# Patient Record
Sex: MALE | Race: OTHER | Employment: FULL TIME | URBAN - METROPOLITAN AREA
[De-identification: names, ages, dates, MRNs, and addresses within clinical notes are randomized per-mention and may not be internally consistent; named-entity substitution may affect disease eponyms.]

---

## 2017-12-30 ENCOUNTER — HOSPITAL ENCOUNTER (EMERGENCY)
Age: 27
Discharge: HOME | End: 2017-12-30
Attending: FAMILY MEDICINE

## 2017-12-30 VITALS
TEMPERATURE: 98.5 F | OXYGEN SATURATION: 99 % | BODY MASS INDEX: 23.68 KG/M2 | DIASTOLIC BLOOD PRESSURE: 77 MMHG | SYSTOLIC BLOOD PRESSURE: 109 MMHG | WEIGHT: 165 LBS | HEART RATE: 107 BPM | RESPIRATION RATE: 14 BRPM

## 2017-12-30 DIAGNOSIS — K52.9 GASTROENTERITIS: Primary | ICD-10-CM

## 2017-12-30 LAB
ALBUMIN SERPL-MCNC: 5.1 G/DL (ref 3.9–4.9)
ALP BLD-CCNC: 110 U/L (ref 35–104)
ALT SERPL-CCNC: 94 U/L (ref 0–41)
ANION GAP SERPL CALCULATED.3IONS-SCNC: 17 MEQ/L (ref 7–13)
AST SERPL-CCNC: 34 U/L (ref 0–40)
BASOPHILS ABSOLUTE: 0 K/UL (ref 0–0.2)
BASOPHILS RELATIVE PERCENT: 0.1 %
BILIRUB SERPL-MCNC: 0.7 MG/DL (ref 0–1.2)
BUN BLDV-MCNC: 17 MG/DL (ref 6–20)
CALCIUM SERPL-MCNC: 10 MG/DL (ref 8.6–10.2)
CHLORIDE BLD-SCNC: 100 MEQ/L (ref 98–107)
CO2: 24 MEQ/L (ref 22–29)
CREAT SERPL-MCNC: 0.78 MG/DL (ref 0.7–1.2)
EOSINOPHILS ABSOLUTE: 0.1 K/UL (ref 0–0.7)
EOSINOPHILS RELATIVE PERCENT: 0.6 %
GFR AFRICAN AMERICAN: >60
GFR NON-AFRICAN AMERICAN: >60
GLOBULIN: 3 G/DL (ref 2.3–3.5)
GLUCOSE BLD-MCNC: 125 MG/DL (ref 74–109)
HCT VFR BLD CALC: 52.1 % (ref 42–52)
HEMOGLOBIN: 17.2 G/DL (ref 14–18)
LYMPHOCYTES ABSOLUTE: 0.3 K/UL (ref 1–4.8)
LYMPHOCYTES RELATIVE PERCENT: 1.5 %
MCH RBC QN AUTO: 27.9 PG (ref 27–31.3)
MCHC RBC AUTO-ENTMCNC: 33 % (ref 33–37)
MCV RBC AUTO: 84.7 FL (ref 80–100)
MONOCYTES ABSOLUTE: 0.5 K/UL (ref 0.2–0.8)
MONOCYTES RELATIVE PERCENT: 2.7 %
NEUTROPHILS ABSOLUTE: 16.9 K/UL (ref 1.4–6.5)
NEUTROPHILS RELATIVE PERCENT: 95.1 %
PDW BLD-RTO: 13.3 % (ref 11.5–14.5)
PLATELET # BLD: 267 K/UL (ref 130–400)
POTASSIUM SERPL-SCNC: 4.6 MEQ/L (ref 3.5–5.1)
RBC # BLD: 6.15 M/UL (ref 4.7–6.1)
SODIUM BLD-SCNC: 141 MEQ/L (ref 132–144)
TOTAL PROTEIN: 8.1 G/DL (ref 6.4–8.1)
WBC # BLD: 17.8 K/UL (ref 4.8–10.8)

## 2017-12-30 PROCEDURE — 80053 COMPREHEN METABOLIC PANEL: CPT

## 2017-12-30 PROCEDURE — 96361 HYDRATE IV INFUSION ADD-ON: CPT

## 2017-12-30 PROCEDURE — 6360000002 HC RX W HCPCS: Performed by: FAMILY MEDICINE

## 2017-12-30 PROCEDURE — 96374 THER/PROPH/DIAG INJ IV PUSH: CPT

## 2017-12-30 PROCEDURE — 99284 EMERGENCY DEPT VISIT MOD MDM: CPT

## 2017-12-30 PROCEDURE — 2580000003 HC RX 258: Performed by: FAMILY MEDICINE

## 2017-12-30 PROCEDURE — 96372 THER/PROPH/DIAG INJ SC/IM: CPT

## 2017-12-30 PROCEDURE — 85025 COMPLETE CBC W/AUTO DIFF WBC: CPT

## 2017-12-30 PROCEDURE — 36415 COLL VENOUS BLD VENIPUNCTURE: CPT

## 2017-12-30 RX ORDER — ONDANSETRON 2 MG/ML
4 INJECTION INTRAMUSCULAR; INTRAVENOUS ONCE
Status: COMPLETED | OUTPATIENT
Start: 2017-12-30 | End: 2017-12-30

## 2017-12-30 RX ORDER — DICYCLOMINE HYDROCHLORIDE 10 MG/ML
20 INJECTION INTRAMUSCULAR ONCE
Status: COMPLETED | OUTPATIENT
Start: 2017-12-30 | End: 2017-12-30

## 2017-12-30 RX ORDER — 0.9 % SODIUM CHLORIDE 0.9 %
1000 INTRAVENOUS SOLUTION INTRAVENOUS ONCE
Status: COMPLETED | OUTPATIENT
Start: 2017-12-30 | End: 2017-12-30

## 2017-12-30 RX ORDER — DICYCLOMINE HYDROCHLORIDE 10 MG/1
10 CAPSULE ORAL EVERY 6 HOURS PRN
Qty: 20 CAPSULE | Refills: 0 | Status: SHIPPED | OUTPATIENT
Start: 2017-12-30 | End: 2018-06-18

## 2017-12-30 RX ORDER — ONDANSETRON 4 MG/1
4 TABLET, ORALLY DISINTEGRATING ORAL EVERY 8 HOURS PRN
Qty: 20 TABLET | Refills: 0 | Status: SHIPPED | OUTPATIENT
Start: 2017-12-30 | End: 2018-06-18

## 2017-12-30 RX ADMIN — ONDANSETRON 4 MG: 2 INJECTION INTRAMUSCULAR; INTRAVENOUS at 13:32

## 2017-12-30 RX ADMIN — SODIUM CHLORIDE 1000 ML: 9 INJECTION, SOLUTION INTRAVENOUS at 13:32

## 2017-12-30 RX ADMIN — DICYCLOMINE HYDROCHLORIDE 20 MG: 20 INJECTION, SOLUTION INTRAMUSCULAR at 14:15

## 2017-12-30 ASSESSMENT — ENCOUNTER SYMPTOMS
VOMITING: 1
ABDOMINAL PAIN: 1
DIARRHEA: 1
RESPIRATORY NEGATIVE: 1
ALLERGIC/IMMUNOLOGIC NEGATIVE: 1
NAUSEA: 1
EYES NEGATIVE: 1

## 2017-12-30 ASSESSMENT — PAIN DESCRIPTION - LOCATION: LOCATION: ABDOMEN

## 2017-12-30 ASSESSMENT — PAIN DESCRIPTION - ORIENTATION: ORIENTATION: LEFT;MID;UPPER

## 2017-12-30 ASSESSMENT — PAIN SCALES - GENERAL: PAINLEVEL_OUTOF10: 9

## 2017-12-30 ASSESSMENT — PAIN DESCRIPTION - DESCRIPTORS: DESCRIPTORS: SHARP

## 2017-12-30 ASSESSMENT — PAIN DESCRIPTION - FREQUENCY: FREQUENCY: INTERMITTENT

## 2017-12-30 NOTE — ED PROVIDER NOTES
[address] [facility] ED  eMERGENCY dEPARTMENT eNCOUnter      Pt Name: Chun Jasso  MRN: [id number]  [place] 1990  Date of evaluation: 12/30/2017  Provider: Jeanne Amezcua MD    [address] [place] [address]       Chief Complaint   Patient presents with    Emesis     diarrhea/weakness since this am-thinks stomach flu or food poisoning-heart palpitations/sob         HISTORY OF PRESENT ILLNESS   (Location/Symptom, Timing/Onset, Context/Setting, Quality, Duration, Modifying Factors, Severity)  Note limiting factors. Chun Jasso is a 24 y.o. male who presents to the emergency department    Nausea vomiting diarrhea and abdominal  27 There is old male patient. State He ate Brit Ar[employer] and Valma Carota yesterday. This morning he woke up with nausea vomiting abdominal cramping since 3 AM this morning. Deny fevers chills deny any blood or mucus in stool deny any other complaint or concern      The history is provided by the patient. Nursing Notes were reviewed. REVIEW OF SYSTEMS    (2-9 systems for level 4, 10 or more for level 5)     Review of Systems   Constitutional: Negative. Eyes: Negative. Respiratory: Negative. Gastrointestinal: Positive for abdominal pain, diarrhea, nausea and vomiting. Endocrine: Negative. Genitourinary: Negative. Musculoskeletal: Negative. Allergic/Immunologic: Negative. Neurological: Negative. Hematological: Negative. Psychiatric/Behavioral: Negative. Except as noted above the remainder of the review of systems was reviewed and negative. PAST MEDICAL HISTORY     Past Medical History:   Diagnosis Date    Anxiety     Atypical chest pain     Depression     FH: CAD (coronary artery disease)     History of tobacco abuse          SURGICAL HISTORY     History reviewed. No pertinent surgical history.       CURRENT MEDICATIONS       Previous Medications    No medications on file       ALLERGIES     Iv contrast [iodides]    FAMILY HISTORY     History reflexes. Skin: Skin is warm and dry. Psychiatric: He has a normal mood and affect. His behavior is normal. Judgment and thought content normal.   Nursing note and vitals reviewed. DIAGNOSTIC RESULTS     EKG: All EKG's are interpreted by the Emergency Department Physician who either signs or Co-signs this chart in the absence of a cardiologist.      RADIOLOGY:   Non-plain film images such as CT, Ultrasound and MRI are read by the radiologist. Plain radiographic images are visualized and preliminarily interpreted by the emergency physician with the below findings:      Interpretation per the Radiologist below, if available at the time of this note:    No orders to display         ED BEDSIDE ULTRASOUND:   Performed by ED Physician - none    LABS:  Labs Reviewed   COMPREHENSIVE METABOLIC PANEL - Abnormal; Notable for the following:        Result Value    Anion Gap 17 (*)     Glucose 125 (*)     Alb 5.1 (*)     Alkaline Phosphatase 110 (*)     ALT 94 (*)     All other components within normal limits   CBC WITH AUTO DIFFERENTIAL - Abnormal; Notable for the following:     WBC 17.8 (*)     RBC 6.15 (*)     Hematocrit 52.1 (*)     Neutrophils # 16.9 (*)     Lymphocytes # 0.3 (*)     All other components within normal limits       All other labs were within normal range or not returned as of this dictation. EMERGENCY DEPARTMENT COURSE and DIFFERENTIAL DIAGNOSIS/MDM:   Vitals:    Vitals:    12/30/17 1304   BP: 109/77   Pulse: 107   Resp: 14   Temp: 98.5 °F (36.9 °C)   SpO2: 99%   Weight: 165 lb (74.8 kg)       ED Course      Stable   MDM  Number of Diagnoses or Management Options  Diagnosis management comments: Patient presents with viral gastroenteritis that started this morning.   Lab stable patient was given IV fluid and Zofran had no active vomiting in the ER with discharged home with Bentyl Zofran and to follow up with PCP       Amount and/or Complexity of Data Reviewed  Clinical lab tests: ordered and reviewed        None    PROCEDURES:  Unless otherwise noted below, none     Procedures    FINAL IMPRESSION      1. Gastroenteritis          DISPOSITION/PLAN   DISPOSITION Decision To Discharge 12/30/2017 04:09:24 PM      PATIENT REFERRED TO:  No follow-up provider specified.     DISCHARGE MEDICATIONS:  New Prescriptions    DICYCLOMINE (BENTYL) 10 MG CAPSULE    Take 1 capsule by mouth every 6 hours as needed (cramps)    ONDANSETRON (ZOFRAN ODT) 4 MG DISINTEGRATING TABLET    Take 1 tablet by mouth every 8 hours as needed for Nausea          (Please note that portions of this note were completed with a voice recognition program.  Efforts were made to edit the dictations but occasionally words are mis-transcribed.)    Albino Villalobos MD (electronically signed)  Attending Emergency Physician          Cem Bermeo MD  12/30/17 [address] [facility] Graham Stark MD  12/30/17 [phone]

## 2018-06-18 ENCOUNTER — HOSPITAL ENCOUNTER (EMERGENCY)
Age: 28
Discharge: HOME | End: 2018-06-18
Attending: EMERGENCY MEDICINE

## 2018-06-18 VITALS
TEMPERATURE: 98.6 F | BODY MASS INDEX: 22.96 KG/M2 | HEIGHT: 69 IN | WEIGHT: 155 LBS | OXYGEN SATURATION: 98 % | SYSTOLIC BLOOD PRESSURE: 116 MMHG | HEART RATE: 79 BPM | RESPIRATION RATE: 16 BRPM | DIASTOLIC BLOOD PRESSURE: 80 MMHG

## 2018-06-18 DIAGNOSIS — J01.10 ACUTE NON-RECURRENT FRONTAL SINUSITIS: Primary | ICD-10-CM

## 2018-06-18 PROCEDURE — 6360000002 HC RX W HCPCS: Performed by: EMERGENCY MEDICINE

## 2018-06-18 PROCEDURE — 99282 EMERGENCY DEPT VISIT SF MDM: CPT

## 2018-06-18 RX ORDER — DEXAMETHASONE SODIUM PHOSPHATE 10 MG/ML
10 INJECTION INTRAMUSCULAR; INTRAVENOUS ONCE
Status: COMPLETED | OUTPATIENT
Start: 2018-06-18 | End: 2018-06-18

## 2018-06-18 RX ORDER — AZITHROMYCIN 250 MG/1
TABLET, FILM COATED ORAL
Qty: 1 PACKET | Refills: 0 | Status: SHIPPED | OUTPATIENT
Start: 2018-06-18 | End: 2019-03-04

## 2018-06-18 RX ADMIN — DEXAMETHASONE SODIUM PHOSPHATE 10 MG: 10 INJECTION INTRAMUSCULAR; INTRAVENOUS at 14:55

## 2018-06-18 ASSESSMENT — PAIN DESCRIPTION - ONSET: ONSET: AWAKENED FROM SLEEP

## 2018-06-18 ASSESSMENT — PAIN SCALES - GENERAL: PAINLEVEL_OUTOF10: 5

## 2018-06-18 ASSESSMENT — ENCOUNTER SYMPTOMS
COUGH: 0
SORE THROAT: 1
FACIAL SWELLING: 1

## 2018-06-18 ASSESSMENT — PAIN DESCRIPTION - PAIN TYPE: TYPE: ACUTE PAIN

## 2018-06-18 ASSESSMENT — PAIN DESCRIPTION - PROGRESSION: CLINICAL_PROGRESSION: NOT CHANGED

## 2018-06-18 ASSESSMENT — PAIN DESCRIPTION - FREQUENCY: FREQUENCY: CONTINUOUS

## 2018-06-18 ASSESSMENT — PAIN DESCRIPTION - LOCATION: LOCATION: FACE

## 2018-06-26 ENCOUNTER — HOSPITAL ENCOUNTER (EMERGENCY)
Age: 28
Discharge: HOME | End: 2018-06-26

## 2018-06-26 VITALS
SYSTOLIC BLOOD PRESSURE: 119 MMHG | TEMPERATURE: 98.2 F | BODY MASS INDEX: 22.19 KG/M2 | OXYGEN SATURATION: 99 % | HEART RATE: 64 BPM | RESPIRATION RATE: 16 BRPM | DIASTOLIC BLOOD PRESSURE: 70 MMHG | WEIGHT: 155 LBS | HEIGHT: 70 IN

## 2018-06-26 DIAGNOSIS — R09.82 POST-NASAL DRIP: Primary | ICD-10-CM

## 2018-06-26 LAB
MONO TEST: NEGATIVE
S PYO AG THROAT QL: NEGATIVE

## 2018-06-26 PROCEDURE — 99282 EMERGENCY DEPT VISIT SF MDM: CPT

## 2018-06-26 PROCEDURE — 87880 STREP A ASSAY W/OPTIC: CPT

## 2018-06-26 PROCEDURE — 87081 CULTURE SCREEN ONLY: CPT

## 2018-06-26 PROCEDURE — 86308 HETEROPHILE ANTIBODY SCREEN: CPT

## 2018-06-26 RX ORDER — IBUPROFEN 800 MG/1
800 TABLET ORAL EVERY 8 HOURS PRN
Qty: 12 TABLET | Refills: 0 | Status: SHIPPED | OUTPATIENT
Start: 2018-06-26 | End: 2019-01-04

## 2018-06-26 RX ORDER — LORATADINE 10 MG/1
10 TABLET ORAL DAILY
Qty: 20 TABLET | Refills: 0 | Status: SHIPPED | OUTPATIENT
Start: 2018-06-26 | End: 2019-03-04

## 2018-06-26 ASSESSMENT — PAIN DESCRIPTION - LOCATION: LOCATION: THROAT

## 2018-06-26 ASSESSMENT — ENCOUNTER SYMPTOMS
GASTROINTESTINAL NEGATIVE: 1
EYES NEGATIVE: 1
SORE THROAT: 1
RESPIRATORY NEGATIVE: 1

## 2018-06-26 ASSESSMENT — PAIN SCALES - GENERAL: PAINLEVEL_OUTOF10: 6

## 2018-06-26 ASSESSMENT — PAIN DESCRIPTION - DESCRIPTORS: DESCRIPTORS: BURNING

## 2018-06-28 LAB — S PYO THROAT QL CULT: NORMAL

## 2019-01-04 ENCOUNTER — HOSPITAL ENCOUNTER (EMERGENCY)
Age: 29
Discharge: HOME | End: 2019-01-04

## 2019-01-04 VITALS
WEIGHT: 165 LBS | SYSTOLIC BLOOD PRESSURE: 136 MMHG | RESPIRATION RATE: 18 BRPM | OXYGEN SATURATION: 98 % | DIASTOLIC BLOOD PRESSURE: 78 MMHG | HEART RATE: 79 BPM | BODY MASS INDEX: 25.01 KG/M2 | TEMPERATURE: 98.6 F | HEIGHT: 68 IN

## 2019-01-04 DIAGNOSIS — J30.9 ALLERGIC RHINITIS, UNSPECIFIED SEASONALITY, UNSPECIFIED TRIGGER: ICD-10-CM

## 2019-01-04 DIAGNOSIS — J01.00 ACUTE MAXILLARY SINUSITIS, RECURRENCE NOT SPECIFIED: Primary | ICD-10-CM

## 2019-01-04 LAB
RAPID INFLUENZA  B AGN: NEGATIVE
RAPID INFLUENZA A AGN: NEGATIVE
S PYO AG THROAT QL: NEGATIVE

## 2019-01-04 PROCEDURE — 87081 CULTURE SCREEN ONLY: CPT

## 2019-01-04 PROCEDURE — 87804 INFLUENZA ASSAY W/OPTIC: CPT

## 2019-01-04 PROCEDURE — 99283 EMERGENCY DEPT VISIT LOW MDM: CPT

## 2019-01-04 PROCEDURE — 87880 STREP A ASSAY W/OPTIC: CPT

## 2019-01-04 RX ORDER — AZELASTINE 1 MG/ML
1 SPRAY, METERED NASAL 2 TIMES DAILY
Qty: 2 BOTTLE | Refills: 1 | Status: SHIPPED | OUTPATIENT
Start: 2019-01-04 | End: 2019-03-04

## 2019-01-04 RX ORDER — IBUPROFEN 400 MG/1
400 TABLET ORAL EVERY 6 HOURS PRN
Qty: 120 TABLET | Refills: 0 | Status: SHIPPED | OUTPATIENT
Start: 2019-01-04

## 2019-01-04 RX ORDER — AMOXICILLIN AND CLAVULANATE POTASSIUM 875; 125 MG/1; MG/1
1 TABLET, FILM COATED ORAL 2 TIMES DAILY
Qty: 20 TABLET | Refills: 0 | Status: SHIPPED | OUTPATIENT
Start: 2019-01-04 | End: 2019-01-14

## 2019-01-04 ASSESSMENT — ENCOUNTER SYMPTOMS
VOICE CHANGE: 0
SINUS PRESSURE: 1
VOMITING: 0
COLOR CHANGE: 0
APNEA: 0
COUGH: 0
ABDOMINAL PAIN: 0
ANAL BLEEDING: 0
FACIAL SWELLING: 0
PHOTOPHOBIA: 0
SHORTNESS OF BREATH: 0
EYE REDNESS: 0
EYE DISCHARGE: 0
SINUS PAIN: 1
ABDOMINAL DISTENTION: 0
NAUSEA: 0
BACK PAIN: 0

## 2019-01-04 ASSESSMENT — PAIN DESCRIPTION - FREQUENCY: FREQUENCY: CONTINUOUS

## 2019-01-04 ASSESSMENT — PAIN DESCRIPTION - DESCRIPTORS: DESCRIPTORS: ACHING;PRESSURE;DISCOMFORT

## 2019-01-04 ASSESSMENT — PAIN DESCRIPTION - PAIN TYPE: TYPE: ACUTE PAIN

## 2019-01-04 ASSESSMENT — PAIN DESCRIPTION - LOCATION: LOCATION: FACE

## 2019-01-04 ASSESSMENT — PAIN DESCRIPTION - ONSET: ONSET: ON-GOING

## 2019-01-04 ASSESSMENT — PAIN SCALES - GENERAL: PAINLEVEL_OUTOF10: 5

## 2019-01-04 ASSESSMENT — PAIN DESCRIPTION - PROGRESSION: CLINICAL_PROGRESSION: NOT CHANGED

## 2019-01-06 LAB — S PYO THROAT QL CULT: NORMAL

## 2019-03-04 ENCOUNTER — HOSPITAL ENCOUNTER (EMERGENCY)
Age: 29
Discharge: HOME | End: 2019-03-05

## 2019-03-04 VITALS
WEIGHT: 165 LBS | SYSTOLIC BLOOD PRESSURE: 126 MMHG | TEMPERATURE: 98.4 F | HEIGHT: 69 IN | OXYGEN SATURATION: 99 % | BODY MASS INDEX: 24.44 KG/M2 | DIASTOLIC BLOOD PRESSURE: 77 MMHG | RESPIRATION RATE: 20 BRPM | HEART RATE: 68 BPM

## 2019-03-04 DIAGNOSIS — J06.9 UPPER RESPIRATORY TRACT INFECTION, UNSPECIFIED TYPE: ICD-10-CM

## 2019-03-04 DIAGNOSIS — J01.90 ACUTE NON-RECURRENT SINUSITIS, UNSPECIFIED LOCATION: Primary | ICD-10-CM

## 2019-03-04 LAB
RAPID INFLUENZA  B AGN: NEGATIVE
RAPID INFLUENZA A AGN: NEGATIVE
S PYO AG THROAT QL: NEGATIVE

## 2019-03-04 PROCEDURE — 6360000002 HC RX W HCPCS: Performed by: PERSONAL EMERGENCY RESPONSE ATTENDANT

## 2019-03-04 PROCEDURE — 99283 EMERGENCY DEPT VISIT LOW MDM: CPT

## 2019-03-04 PROCEDURE — 87804 INFLUENZA ASSAY W/OPTIC: CPT

## 2019-03-04 PROCEDURE — 87081 CULTURE SCREEN ONLY: CPT

## 2019-03-04 PROCEDURE — 87880 STREP A ASSAY W/OPTIC: CPT

## 2019-03-04 RX ADMIN — DEXAMETHASONE INTENSOL 8 MG: 1 SOLUTION, CONCENTRATE ORAL at 23:15

## 2019-03-04 ASSESSMENT — ENCOUNTER SYMPTOMS
COUGH: 1
ABDOMINAL PAIN: 0
COLOR CHANGE: 0
NAUSEA: 0
RHINORRHEA: 0
SINUS PAIN: 1
SINUS PRESSURE: 1
DIARRHEA: 0
VOMITING: 0
SORE THROAT: 1
BLOOD IN STOOL: 0
SHORTNESS OF BREATH: 0

## 2019-03-04 ASSESSMENT — PAIN DESCRIPTION - LOCATION: LOCATION: THROAT

## 2019-03-04 ASSESSMENT — PAIN DESCRIPTION - FREQUENCY: FREQUENCY: CONTINUOUS

## 2019-03-04 ASSESSMENT — PAIN SCALES - GENERAL: PAINLEVEL_OUTOF10: 6

## 2019-03-04 ASSESSMENT — PAIN DESCRIPTION - DESCRIPTORS: DESCRIPTORS: THROBBING

## 2019-03-04 ASSESSMENT — PAIN DESCRIPTION - PAIN TYPE: TYPE: ACUTE PAIN

## 2019-03-05 RX ORDER — AMOXICILLIN 500 MG/1
500 CAPSULE ORAL 2 TIMES DAILY
Qty: 14 CAPSULE | Refills: 0 | Status: SHIPPED | OUTPATIENT
Start: 2019-03-05 | End: 2019-03-12

## 2019-03-05 RX ORDER — IPRATROPIUM BROMIDE 42 UG/1
2 SPRAY, METERED NASAL 4 TIMES DAILY
Qty: 1 BOTTLE | Refills: 0 | Status: SHIPPED | OUTPATIENT
Start: 2019-03-05 | End: 2019-04-04

## 2019-03-06 LAB — S PYO THROAT QL CULT: NORMAL

## 2020-03-02 ENCOUNTER — APPOINTMENT (OUTPATIENT)
Dept: GENERAL RADIOLOGY | Age: 30
End: 2020-03-02
Payer: COMMERCIAL

## 2020-03-02 ENCOUNTER — HOSPITAL ENCOUNTER (EMERGENCY)
Age: 30
Discharge: HOME | End: 2020-03-02
Payer: COMMERCIAL

## 2020-03-02 VITALS
SYSTOLIC BLOOD PRESSURE: 135 MMHG | RESPIRATION RATE: 19 BRPM | OXYGEN SATURATION: 95 % | HEIGHT: 69 IN | BODY MASS INDEX: 26.66 KG/M2 | HEART RATE: 68 BPM | DIASTOLIC BLOOD PRESSURE: 78 MMHG | WEIGHT: 180 LBS | TEMPERATURE: 98.2 F

## 2020-03-02 PROCEDURE — 71046 X-RAY EXAM CHEST 2 VIEWS: CPT

## 2020-03-02 PROCEDURE — 99283 EMERGENCY DEPT VISIT LOW MDM: CPT

## 2020-03-02 RX ORDER — AZITHROMYCIN 250 MG/1
250 TABLET, FILM COATED ORAL SEE ADMIN INSTRUCTIONS
Qty: 6 TABLET | Refills: 0 | Status: SHIPPED | OUTPATIENT
Start: 2020-03-02 | End: 2020-03-07

## 2020-03-02 RX ORDER — ACETAMINOPHEN 500 MG
1000 TABLET ORAL EVERY 6 HOURS PRN
Qty: 40 TABLET | Refills: 0 | Status: SHIPPED | OUTPATIENT
Start: 2020-03-02 | End: 2020-03-07

## 2020-03-02 ASSESSMENT — PAIN DESCRIPTION - LOCATION: LOCATION: FACE

## 2020-03-02 ASSESSMENT — ENCOUNTER SYMPTOMS
VOMITING: 0
ABDOMINAL PAIN: 0
PHOTOPHOBIA: 0
COUGH: 0
SINUS PRESSURE: 1
SINUS PAIN: 1
SORE THROAT: 0
WHEEZING: 0
SHORTNESS OF BREATH: 0
NAUSEA: 0

## 2020-03-02 ASSESSMENT — PAIN SCALES - GENERAL: PAINLEVEL_OUTOF10: 6

## 2020-03-03 NOTE — ED PROVIDER NOTES
[address] [facility] ED  EMERGENCY DEPARTMENT ENCOUNTER      Pt Name: Tran Garcia  MRN: [id number]  [place] 1990  Date of evaluation: 3/2/2020  Provider: Afsaneh Waddell Dr       Chief Complaint   Patient presents with    Facial Pain     sinus pressure         HISTORY OF PRESENT ILLNESS   (Location/Symptom, Timing/Onset, Context/Setting, Quality, Duration, Modifying Factors, Severity)  Note limiting factors. Tran Garcia is a 34 y.o. male who per chart review has history of anxiety, depression, CAD presents to the emergency department with gradual onset constant worsening moderate sinus pressure and congestion over the last few days. He has not tried anything for the symptoms. He denies aggravating or alleviating factors. He states he has had sinus infections in the past and this feels similar. He also states that a coworker at work was recently diagnosed with TB and was wondering if he could be tested for TB in the emergency room today. Patient denied fever chills cough shortness of breath night sweats weight loss headache rhinorrhea nausea vomiting diarrhea abdominal pain. HPI    Nursing Notes were reviewed. REVIEW OF SYSTEMS    (2-9 systems for level 4, 10 or more for level 5)     Review of Systems   Constitutional: Negative for chills and fever. HENT: Positive for congestion, sinus pressure and sinus pain. Negative for sore throat. Eyes: Negative for photophobia. Respiratory: Negative for cough, shortness of breath and wheezing. Cardiovascular: Negative for chest pain and palpitations. Gastrointestinal: Negative for abdominal pain, nausea and vomiting. Genitourinary: Negative for dysuria, frequency and hematuria. Musculoskeletal: Negative for myalgias. Allergic/Immunologic: Negative for immunocompromised state. Neurological: Negative for dizziness, weakness and headaches. All other systems reviewed and are negative.       Except as noted above  Intimate partner violence:     Fear of current or ex partner: None     Emotionally abused: None     Physically abused: None     Forced sexual activity: None   Other Topics Concern    None   Social History Narrative    None       SCREENINGS                PHYSICAL EXAM    (up to 7 for level 4, 8 or more for level 5)     ED Triage Vitals [03/02/20 1447]   BP Temp Temp Source Pulse Resp SpO2 Height Weight   135/78 98.2 °F (36.8 °C) Oral 68 19 95 % 5' 9\" (1.753 m) 180 lb (81.6 kg)       Physical Exam  Constitutional:       General: He is not in acute distress. Appearance: He is well-developed. He is not toxic-appearing. HENT:      Head: Normocephalic and atraumatic. Nose: Nose normal.      Comments: Tenderness to percussion of the maxillary and frontal sinuses. Mouth/Throat:      Mouth: Mucous membranes are moist.   Eyes:      Pupils: Pupils are equal, round, and reactive to light. Neck:      Musculoskeletal: Normal range of motion. Cardiovascular:      Rate and Rhythm: Normal rate and regular rhythm. Heart sounds: No murmur. No friction rub. No gallop. Pulmonary:      Effort: Pulmonary effort is normal. No accessory muscle usage, prolonged expiration or respiratory distress. Breath sounds: Normal breath sounds. No decreased breath sounds, wheezing, rhonchi or rales. Abdominal:      General: There is no distension. Tenderness: There is no abdominal tenderness. Musculoskeletal:         General: No swelling. Skin:     General: Skin is warm and dry. Neurological:      Mental Status: He is alert and oriented to person, place, and time.          DIAGNOSTIC RESULTS     EKG: All EKG's are interpreted by the Emergency Department Physician who either signs or Co-signs this chart in the absence of a cardiologist.        RADIOLOGY:   Non-plain film images such as CT, Ultrasound and MRI are read by the radiologist. Plain radiographic images are visualized and preliminarily interpreted by the emergency physician with the below findings:    Neg for acute abnormality     Interpretation per the Radiologist below, if available at the time of this note:    XR CHEST STANDARD (2 VW)   Final Result   NO ACUTE ACTIVE CARDIOPULMONARY PROCESS            ED BEDSIDE ULTRASOUND:   Performed by ED Physician - none    LABS:  Labs Reviewed - No data to display    All other labs were within normal range or not returned as of this dictation. EMERGENCY DEPARTMENT COURSE and DIFFERENTIAL DIAGNOSIS/MDM:   Vitals:    Vitals:    03/02/20 1447   BP: 135/78   Pulse: 68   Resp: 19   Temp: 98.2 °F (36.8 °C)   TempSrc: Oral   SpO2: 95%   Weight: 180 lb (81.6 kg)   Height: 5' 9\" (1.753 m)       MDM    Patient is a 82-year-old male who presents to the ED with sinus pressure and congestion. He is afebrile and hemodynamically stable. Suspect acute sinusitis. Chest x-ray was negative for acute abnormality. There is less concern for TB as patient is not exhibiting any signs or symptoms of TB at this time, no evidence on chest x-ray. The patient was discharged home in stable condition. The patient went directly to the TB clinic from the ED for further evaluation. He was given a prescription for Zithromax and Tylenol. Patient was given a referral to a primary care doctor and will follow-up in 1 to 2 days or return to the emergency room if his symptoms worsen. He was given TB and infection warning sign. Patient understands and agrees to plan. All questions were answered. REASSESSMENT          CRITICAL CARE TIME   Total Critical Care time was 0 minutes, excluding separately reportable procedures. There was a high probability of clinically significant/life threatening deterioration in the patient's condition which required my urgent intervention. CONSULTS:  None    PROCEDURES:  Unless otherwise noted below, none         FINAL IMPRESSION      1.  Acute sinusitis, recurrence not specified, unspecified location          DISPOSITION/PLAN   DISPOSITION Decision To Discharge 03/02/2020 03:17:58 PM      PATIENT REFERRED TO:  Nga Alvares MD  [address] [facility] [address] [facility]  [phone]    Schedule an appointment as soon as possible for a visit in 2 days      [facility]) ED  [address] [facility] [address]  [phone]  Go to   As needed, If symptoms worsen      DISCHARGE MEDICATIONS:  Discharge Medication List as of 3/2/2020  3:24 PM      START taking these medications    Details   azithromycin (ZITHROMAX) 250 MG tablet Take 1 tablet by mouth See Admin Instructions for 5 days 500mg on day 1 followed by 250mg on days 2 - 5, Disp-6 tablet, R-0Print      acetaminophen (APAP EXTRA STRENGTH) 500 MG tablet Take 2 tablets by mouth every 6 hours as needed for Pain, Disp-40 tablet, R-0Print           Controlled Substances Monitoring:     No flowsheet data found.     (Please note that portions of this note were completed with a voice recognition program.  Efforts were made to edit the dictations but occasionally words are mis-transcribed.)    Tianna Garces PA-C (electronically signed)              Tianna Garces PA-C  03/02/20 [id number]

## 2020-06-20 ENCOUNTER — NURSE TRIAGE (OUTPATIENT)
Dept: OTHER | Facility: CLINIC | Age: 30
End: 2020-06-20

## 2021-09-30 ENCOUNTER — NURSE ONLY (OUTPATIENT)
Dept: FAMILY MEDICINE CLINIC | Age: 31
End: 2021-09-30

## 2021-09-30 ENCOUNTER — VIRTUAL VISIT (OUTPATIENT)
Dept: FAMILY MEDICINE CLINIC | Age: 31
End: 2021-09-30
Payer: COMMERCIAL

## 2021-09-30 DIAGNOSIS — R09.81 NASAL CONGESTION: Primary | ICD-10-CM

## 2021-09-30 DIAGNOSIS — U07.1 COVID-19: ICD-10-CM

## 2021-09-30 PROCEDURE — 99442 PR PHYS/QHP TELEPHONE EVALUATION 11-20 MIN: CPT | Performed by: NURSE PRACTITIONER

## 2021-09-30 RX ORDER — OMEPRAZOLE 40 MG/1
40 CAPSULE, DELAYED RELEASE ORAL DAILY
COMMUNITY

## 2021-09-30 SDOH — ECONOMIC STABILITY: TRANSPORTATION INSECURITY
IN THE PAST 12 MONTHS, HAS LACK OF TRANSPORTATION KEPT YOU FROM MEETINGS, WORK, OR FROM GETTING THINGS NEEDED FOR DAILY LIVING?: NO

## 2021-09-30 SDOH — ECONOMIC STABILITY: FOOD INSECURITY: WITHIN THE PAST 12 MONTHS, THE FOOD YOU BOUGHT JUST DIDN'T LAST AND YOU DIDN'T HAVE MONEY TO GET MORE.: NEVER TRUE

## 2021-09-30 SDOH — ECONOMIC STABILITY: TRANSPORTATION INSECURITY
IN THE PAST 12 MONTHS, HAS THE LACK OF TRANSPORTATION KEPT YOU FROM MEDICAL APPOINTMENTS OR FROM GETTING MEDICATIONS?: NO

## 2021-09-30 SDOH — ECONOMIC STABILITY: FOOD INSECURITY: WITHIN THE PAST 12 MONTHS, YOU WORRIED THAT YOUR FOOD WOULD RUN OUT BEFORE YOU GOT MONEY TO BUY MORE.: NEVER TRUE

## 2021-09-30 ASSESSMENT — ENCOUNTER SYMPTOMS
RHINORRHEA: 1
VOMITING: 0
ABDOMINAL PAIN: 0
SORE THROAT: 0
SHORTNESS OF BREATH: 0
SINUS PAIN: 0
CHEST TIGHTNESS: 0
EYE REDNESS: 0
COUGH: 1
NAUSEA: 0
VOICE CHANGE: 0
SINUS PRESSURE: 0
STRIDOR: 0
WHEEZING: 0
DIARRHEA: 0
ABDOMINAL DISTENTION: 0
EYE DISCHARGE: 0
EYE ITCHING: 0
COLOR CHANGE: 0
TROUBLE SWALLOWING: 0
EYE PAIN: 0

## 2021-09-30 ASSESSMENT — SOCIAL DETERMINANTS OF HEALTH (SDOH): HOW HARD IS IT FOR YOU TO PAY FOR THE VERY BASICS LIKE FOOD, HOUSING, MEDICAL CARE, AND HEATING?: NOT HARD AT ALL

## 2021-09-30 NOTE — PROGRESS NOTES
This visit began at [address]  Patient has been screened to determine that this visit qualifies for a \"Telephone Visit\". Patient is currently established with the current medical practice, the condition being reviewed was not addressed within the previous 7 days and is not likely be determined to need a procedure within the next 24 hours. This visit was via telephone due to the restrictions of the COVID-19 pandemic. All issues as below were discussed and addressed but no physical exam was performed. It was felt the patient should be evaluated in the clinic there will be comment below demonstrating they were directed there. The patient is aware and has given verbal consent to be billed for this telephone encounter. Chief Complaint   Patient presents with    Other     day 7 covid. works requiring that he gets tested before returning. HPI  Patient is on telephone visit with c/o cough, loss of taste and smell. He had fever for 4 days that has subsided. He is not taking any fever reducers. He denies SOB or wheezing. Says he has no GI symptoms. Says rapid testing + 1 week ago at work. Says he has had symptoms for the last 7 days. He reports his quarantine is over on Monday. Says he has a rapid scheduled at [facility] and wants a PCR test done today. PMH:    Current Outpatient Medications on File Prior to Visit   Medication Sig Dispense Refill    omeprazole (PRILOSEC) 40 MG delayed release capsule Take 40 mg by mouth daily      acetaminophen (APAP EXTRA STRENGTH) 500 MG tablet Take 2 tablets by mouth every 6 hours as needed for Pain 40 tablet 0    ipratropium (ATROVENT) 0.06 % nasal spray 2 sprays by Nasal route 4 times daily 1 Bottle 0    ibuprofen (IBU) 400 MG tablet Take 1 tablet by mouth every 6 hours as needed for Pain (Patient not taking: Reported on 9/30/2021) 120 tablet 0     No current facility-administered medications on file prior to visit.      Past Medical History:   Diagnosis Date    Anxiety     Atypical chest pain     Depression     FH: CAD (coronary artery disease)     History of tobacco abuse      History reviewed. No pertinent surgical history. Social History     Socioeconomic History    Marital status: Single     Spouse name: Not on file    Number of children: Not on file    Years of education: Not on file    Highest education level: Not on file   Occupational History    Not on file   Tobacco Use    Smoking status: Former Smoker     Packs/day: 1.50     Years: 8.00     Pack years: 12.00     Types: Cigarettes     Quit date: 2013     Years since quittin.3    Smokeless tobacco: Never Used   Substance and Sexual Activity    Alcohol use: No    Drug use: Not on file    Sexual activity: Yes     Partners: Female   Other Topics Concern    Not on file   Social History Narrative    Not on file     Social Determinants of Health     Financial Resource Strain: Low Risk     Difficulty of Paying Living Expenses: Not hard at all   Food Insecurity: No Food Insecurity    Worried About [address] in the Last Year: Never true    [address] [employer] in the Last Year: Never true   Transportation Needs: No Transportation Needs    Lack of Transportation (Medical): No    Lack of Transportation (Non-Medical): No   Physical Activity:     Days of Exercise per Week:     Minutes of Exercise per Session:    Stress:     Feeling of Stress :    Social Connections:     Frequency of Communication with Friends and Family:     Frequency of Social Gatherings with Friends and Family:     Attends Holiness Services:     Active Member of Clubs or Organizations:     Attends Club or Organization Meetings:     Marital Status:    Intimate Partner Violence:     Fear of Current or Ex-Partner:     Emotionally Abused:     Physically Abused:     Sexually Abused:      History reviewed. No pertinent family history.   Allergies:  Chlorpheniramine-phenylephrine, Iv contrast [iodides], and Pheniramine    Review of Systems   Constitutional: Positive for chills, fatigue and fever. Negative for activity change, appetite change, diaphoresis and unexpected weight change. HENT: Positive for congestion, postnasal drip and rhinorrhea. Negative for ear discharge, ear pain, sinus pressure, sinus pain, sneezing, sore throat, tinnitus, trouble swallowing and voice change. Eyes: Negative for pain, discharge, redness and itching. Respiratory: Positive for cough. Negative for chest tightness, shortness of breath, wheezing and stridor. Cardiovascular: Negative for chest pain. Gastrointestinal: Negative for abdominal distention, abdominal pain, diarrhea, nausea and vomiting. Musculoskeletal: Negative for arthralgias and myalgias. Skin: Negative for color change and rash. Neurological: Negative for dizziness, weakness, light-headedness and headaches. Hematological: Negative for adenopathy. PHYSICAL EXAM / RESULTS    There were no vitals taken for this visit. Physical Exam  Vitals reviewed. Constitutional:       General: He is awake. HENT:      Right Ear: Hearing normal.      Left Ear: Hearing normal.   Pulmonary:      Effort: Pulmonary effort is normal.   Neurological:      Mental Status: He is alert and oriented to person, place, and time. Mental status is at baseline. Psychiatric:         Attention and Perception: Attention and perception normal.         Mood and Affect: Mood normal.         Speech: Speech normal.         Behavior: Behavior normal. Behavior is cooperative. Thought Content:  Thought content normal.         Cognition and Memory: Cognition and memory normal.         Judgment: Judgment normal.       Vitals signs: pt does not have the proper equipment to take all VS.    The physical is not performed due to the visit being a telephone counter as indicated due to the restrictions of the COVID-19 pandemic    No results found for this or any

## 2021-09-30 NOTE — PATIENT INSTRUCTIONS
Patient Education        Learning About Coronavirus [phone]  What is coronavirus (COVID-19)? COVID-19 is a disease caused by a type of coronavirus. This illness was first found in December 2019. It has since spread worldwide. Coronaviruses are a large group of viruses. They cause the common cold. They also cause more serious illnesses like Middle East respiratory syndrome (MERS) and severe acute respiratory syndrome (SARS). COVID-19 is caused by a novel coronavirus. That means it's a new type that has not been seen in people before. What are the symptoms? COVID-19 symptoms may include:  · Fever. · Cough. · Trouble breathing. · Chills or repeated shaking with chills. · Muscle and body aches. · Headache. · Sore throat. · New loss of taste or smell. · Vomiting. · Diarrhea. In severe cases, COVID-19 can cause pneumonia and make it hard to breathe without help from a machine. It can cause death. How is it diagnosed? COVID-19 is diagnosed with a viral test. This may also be called a PCR test or antigen test. It looks for evidence of the virus in your breathing passages or lungs (respiratory system). The test is most often done on a sample from the nose, throat, or lungs. It's sometimes done on a sample of saliva. One way a sample is collected is by putting a long swab into the back of your nose. How is it treated? Mild cases of COVID-19 can be treated at home. Serious cases need treatment in the hospital. Treatment may include medicines to reduce symptoms, plus breathing support such as oxygen therapy or a ventilator. Some people may be placed on their belly to help their oxygen levels. Treatments that may help people who have COVID-19 include:  Antiviral medicines. These medicines treat viral infections. Remdesivir is an example. Immune-based therapy. These medicines help the immune system fight COVID-19. Examples include monoclonal antibodies. Blood thinners.    These medicines help your home. If possible, stay in a separate bedroom and use a separate bathroom. · Wear a mask when you are around other people. · If you have to leave home, avoid crowds and try to stay at least 6 feet away from other people. · Avoid contact with pets and other animals. · Cover your mouth and nose with a tissue when you cough or sneeze. Then throw it in the trash right away. · Wash your hands often, especially after you cough or sneeze. Use soap and water, and scrub for at least 20 seconds. If soap and water aren't available, use an alcohol-based hand . · Don't share personal household items. These include bedding, towels, cups and glasses, and eating utensils. · [address] in the warmest water allowed for the fabric type, and dry it completely. It's okay to wash other people's laundry with yours. · Clean and disinfect your home. Use household  and disinfectant wipes or sprays. When should you call for help? Call 911 anytime you think you may need emergency care. For example, call if you have life-threatening symptoms, such as:    · You have severe trouble breathing. (You can't talk at all.)     · You have constant chest pain or pressure.     · You are severely dizzy or lightheaded.     · You are confused or can't think clearly.     · You have pale, gray, or blue-colored skin or lips.     · You pass out (lose consciousness) or are very hard to wake up. Call your doctor now or seek immediate medical care if:    · You have moderate trouble breathing. (You can't speak a full sentence.)     · You are coughing up blood (more than about 1 teaspoon).     · You have signs of low blood pressure. These include feeling lightheaded; being too weak to stand; and having cold, pale, clammy skin.    Watch closely for changes in your health, and be sure to contact your doctor if:    · Your symptoms get worse.     · You are not getting better as expected.     · You have new or worse symptoms of anxiety, depression, nightmares, or flashbacks. Call before you go to the doctor's office. Follow their instructions. And wear a mask. Current as of: July 1, 2021               Content Version: 13.0  © 0568-7451 [employer]. Care instructions adapted under license by [facility]. If you have questions about a medical condition or this instruction, always ask your healthcare professional. [place] [address] any warranty or liability for your use of this information.

## 2021-10-03 ENCOUNTER — TELEPHONE (OUTPATIENT)
Dept: FAMILY MEDICINE CLINIC | Age: 31
End: 2021-10-03

## 2021-10-03 NOTE — TELEPHONE ENCOUNTER
Pt calling about COVID test results. States he would need to know if he is negative so he may return to work.

## 2021-10-03 NOTE — TELEPHONE ENCOUNTER
Spoke to patient. Given note to return to work. Explained his result and that he has met the quarantine isolation of 10 days.

## 2021-10-06 ENCOUNTER — TELEPHONE (OUTPATIENT)
Dept: PRIMARY CARE CLINIC | Age: 31
End: 2021-10-06

## 2021-10-06 NOTE — TELEPHONE ENCOUNTER
Called patient with results and left message to call back.  Patient Covid test was + and patient needs to remain in quarantine for 10 days from onset of symptoms

## 2024-08-01 ENCOUNTER — APPOINTMENT (OUTPATIENT)
Dept: ORTHOPEDIC SURGERY | Facility: CLINIC | Age: 34
End: 2024-08-01
Payer: COMMERCIAL